# Patient Record
Sex: MALE | Race: BLACK OR AFRICAN AMERICAN | NOT HISPANIC OR LATINO | URBAN - METROPOLITAN AREA
[De-identification: names, ages, dates, MRNs, and addresses within clinical notes are randomized per-mention and may not be internally consistent; named-entity substitution may affect disease eponyms.]

---

## 2019-09-26 ENCOUNTER — EMERGENCY (EMERGENCY)
Facility: HOSPITAL | Age: 64
LOS: 0 days | Discharge: HOME | End: 2019-09-26
Admitting: EMERGENCY MEDICINE
Payer: COMMERCIAL

## 2019-09-26 VITALS
OXYGEN SATURATION: 96 % | TEMPERATURE: 97 F | HEART RATE: 100 BPM | DIASTOLIC BLOOD PRESSURE: 81 MMHG | RESPIRATION RATE: 18 BRPM | SYSTOLIC BLOOD PRESSURE: 163 MMHG

## 2019-09-26 DIAGNOSIS — J02.9 ACUTE PHARYNGITIS, UNSPECIFIED: ICD-10-CM

## 2019-09-26 DIAGNOSIS — Z41.9 ENCOUNTER FOR PROCEDURE FOR PURPOSES OTHER THAN REMEDYING HEALTH STATE, UNSPECIFIED: Chronic | ICD-10-CM

## 2019-09-26 PROCEDURE — 99283 EMERGENCY DEPT VISIT LOW MDM: CPT

## 2019-09-26 RX ORDER — AMOXICILLIN 250 MG/5ML
1 SUSPENSION, RECONSTITUTED, ORAL (ML) ORAL
Qty: 20 | Refills: 0
Start: 2019-09-26 | End: 2019-10-05

## 2019-09-26 NOTE — ED PROVIDER NOTE - PATIENT PORTAL LINK FT
You can access the FollowMyHealth Patient Portal offered by [facility] by registering at the following website: [email or web address]. By joining [facility] FollowMyHealth portal, you will also be able to view your health information using other applications (apps) compatible with our system.

## 2019-09-26 NOTE — ED PROVIDER NOTE - ENMT, MLM
Airway patent, Nasal mucosa clear. Mouth with normal mucosa. Throat has no vesicles, no oropharyngeal exudates and uvula is midline- mild erythema.  TM's clear bilaterally.  No sinus tenderness.

## 2019-09-26 NOTE — ED ADULT NURSE NOTE - PMH
DM (diabetes mellitus)    ED (erectile dysfunction)  penile atrophy  HTN (hypertension)    Lymphoma  non hodgkins

## 2019-09-26 NOTE — ED PROVIDER NOTE - NSFOLLOWUPINSTRUCTIONS_ED_ALL_ED_FT
Sore Throat  ImageWhen you have a sore throat, your throat may:  Hurt.  Burn.  Feel irritated.  Feel scratchy.  Many things can cause a sore throat, including:  An infection.  Allergies.  Dryness in the air.  Smoke or pollution.  Gastroesophageal reflux disease (GERD).  A tumor.  A sore throat can be the first sign of another sickness. It can happen with other problems, like coughing or a fever. Most sore throats go away without treatment.    Follow these instructions at home:  Take over-the-counter medicines only as told by your doctor.  Drink enough fluids to keep your pee (urine) clear or pale yellow.  Rest when you feel you need to.  To help with pain, try:  Sipping warm liquids, such as broth, herbal tea, or warm water.  Eating or drinking cold or frozen liquids, such as frozen ice pops.  Gargling with a salt-water mixture 3–4 times a day or as needed. To make a salt-water mixture, add ½–1 tsp of salt in 1 cup of warm water. Mix it until you cannot see the salt anymore.  Sucking on hard candy or throat lozenges.  Putting a cool-mist humidifier in your bedroom at night.  Sitting in the bathroom with the door closed for 5–10 minutes while you run hot water in the shower.  Do not use any tobacco products, such as cigarettes, chewing tobacco, and e-cigarettes. If you need help quitting, ask your doctor.  Contact a doctor if:  You have a fever for more than 2–3 days.  You keep having symptoms for more than 2–3 days.  Your throat does not get better in 7 days.  You have a fever and your symptoms suddenly get worse.  Get help right away if:  You have trouble breathing.  You cannot swallow fluids, soft foods, or your saliva.  You have swelling in your throat or neck that gets worse.  You keep feeling like you are going to throw up (vomit).  You keep throwing up.  This information is not intended to replace advice given to you by your health care provider. Make sure you discuss any questions you have with your health care provider.    Document Released: 09/26/2009 Document Revised: 08/13/2017 Document Reviewed: 10/07/2016  [employer] Patient Education © 2019 [employer]

## 2019-09-26 NOTE — ED PROVIDER NOTE - CLINICAL SUMMARY MEDICAL DECISION MAKING FREE TEXT BOX
po anbx; pt will follow up with PCP in 2-3 days; any new or worsening symptoms, pt will return to ER.

## 2019-09-26 NOTE — ED PROVIDER NOTE - OBJECTIVE STATEMENT
63 y.o. male with a PMH of lymphoma, DM, and HTN presented to the ER s/o sore throat, Right ear ache, and congestion for the past 2 weeks.  Denies fever, chills, SOB, nausea, vomiting, diarrhea, rash.  No other complaints.

## 2019-09-26 NOTE — ED ADULT NURSE NOTE - NSIMPLEMENTINTERV_GEN_ALL_ED
Implemented All Universal Safety Interventions:  [place] to call system. Call bell, personal items and telephone within reach. Instruct patient to call for assistance. Room bathroom lighting operational. Non-slip footwear when patient is off stretcher. Physically safe environment: no spills, clutter or unnecessary equipment. Stretcher in lowest position, wheels locked, appropriate side rails in place.

## 2023-11-28 ENCOUNTER — LAB REQUISITION (OUTPATIENT)
Dept: LAB | Facility: HOSPITAL | Age: 68
End: 2023-11-28

## 2023-11-28 DIAGNOSIS — Z02.83 ENCOUNTER FOR BLOOD-ALCOHOL AND BLOOD-DRUG TEST: ICD-10-CM
